# Patient Record
Sex: MALE | Race: WHITE | NOT HISPANIC OR LATINO
[De-identification: names, ages, dates, MRNs, and addresses within clinical notes are randomized per-mention and may not be internally consistent; named-entity substitution may affect disease eponyms.]

---

## 2021-07-26 PROBLEM — Z00.00 ENCOUNTER FOR PREVENTIVE HEALTH EXAMINATION: Status: ACTIVE | Noted: 2021-07-26

## 2021-08-02 ENCOUNTER — APPOINTMENT (OUTPATIENT)
Dept: ENDOCRINOLOGY | Facility: CLINIC | Age: 31
End: 2021-08-02
Payer: MEDICAID

## 2021-08-02 VITALS
WEIGHT: 153 LBS | BODY MASS INDEX: 23.19 KG/M2 | DIASTOLIC BLOOD PRESSURE: 64 MMHG | SYSTOLIC BLOOD PRESSURE: 106 MMHG | HEART RATE: 86 BPM | HEIGHT: 68 IN

## 2021-08-02 DIAGNOSIS — E29.1 TESTICULAR HYPOFUNCTION: ICD-10-CM

## 2021-08-02 PROCEDURE — 99205 OFFICE O/P NEW HI 60 MIN: CPT | Mod: 25

## 2021-08-03 LAB
ALBUMIN SERPL ELPH-MCNC: 4.7 G/DL
ALP BLD-CCNC: 181 U/L
ALT SERPL-CCNC: 25 U/L
ANION GAP SERPL CALC-SCNC: 12 MMOL/L
AST SERPL-CCNC: 21 U/L
BASOPHILS # BLD AUTO: 0.03 K/UL
BASOPHILS NFR BLD AUTO: 0.4 %
BILIRUB SERPL-MCNC: 0.4 MG/DL
BUN SERPL-MCNC: 17 MG/DL
CALCIUM SERPL-MCNC: 10.3 MG/DL
CHLORIDE SERPL-SCNC: 100 MMOL/L
CO2 SERPL-SCNC: 25 MMOL/L
CREAT SERPL-MCNC: 0.84 MG/DL
EOSINOPHIL # BLD AUTO: 0.07 K/UL
EOSINOPHIL NFR BLD AUTO: 1 %
FERRITIN SERPL-MCNC: 179 NG/ML
FSH SERPL-MCNC: 0.9 IU/L
GLUCOSE SERPL-MCNC: 92 MG/DL
HCT VFR BLD CALC: 42.3 %
HGB BLD-MCNC: 13.1 G/DL
IMM GRANULOCYTES NFR BLD AUTO: 0.3 %
LH SERPL-ACNC: 0.8 IU/L
LYMPHOCYTES # BLD AUTO: 1.02 K/UL
LYMPHOCYTES NFR BLD AUTO: 14.3 %
MAN DIFF?: NORMAL
MCHC RBC-ENTMCNC: 27.8 PG
MCHC RBC-ENTMCNC: 31 GM/DL
MCV RBC AUTO: 89.6 FL
MONOCYTES # BLD AUTO: 0.7 K/UL
MONOCYTES NFR BLD AUTO: 9.8 %
NEUTROPHILS # BLD AUTO: 5.31 K/UL
NEUTROPHILS NFR BLD AUTO: 74.2 %
PLATELET # BLD AUTO: 258 K/UL
POTASSIUM SERPL-SCNC: 4.3 MMOL/L
PROLACTIN SERPL-MCNC: 7.2 NG/ML
PROT SERPL-MCNC: 7.4 G/DL
RBC # BLD: 4.72 M/UL
RBC # FLD: 12.4 %
SODIUM SERPL-SCNC: 138 MMOL/L
TSH SERPL-ACNC: 0.64 UIU/ML
WBC # FLD AUTO: 7.15 K/UL

## 2021-08-04 LAB
TESTOST BND SERPL-MCNC: 0.6 PG/ML
TESTOSTERONE TOTAL S: <3 NG/DL
TRANSFERRIN SERPL-MCNC: 280 MG/DL

## 2021-08-04 NOTE — REVIEW OF SYSTEMS
[Negative] : Heme/Lymph [As Noted in HPI] : as noted in HPI [Visual Field Defect] : no visual field defect [Blurred Vision] : no blurred vision [Erectile Dysfunction] : erectile dysfunction [Decreased Libido] : decreased libido [Acne] : no acne [Hair Loss] : no hair loss [Headaches] : no headaches [Depression] : depression [Polydipsia] : no polydipsia [Cold Intolerance] : no cold intolerance [FreeTextEntry2] : tiredness [de-identified] : Hair loss

## 2021-08-04 NOTE — END OF VISIT
[FreeTextEntry3] : All medical record entries made by the Scribe were at my, Dr. Jonatan Rutherford, direction and personally dictated by me on 08/02/2021 . I have reviewed the chart and agree that the record accurately reflects my personal performance of the history, physical exam, assessment and plan. I have also personally directed, reviewed and agreed with the chart. [Time Spent: ___ minutes] : I have spent [unfilled] minutes of time on the encounter.

## 2021-08-04 NOTE — ASSESSMENT
[FreeTextEntry1] : 32 y/o M pt with: \par \par 1. Hypogonadism with marked decreased of free and total testosterone, no info on LH\par Patient was diagnosed with hypogonadism in October 2020 in the [place]. Pt was offered testosterone treatment. [place] the [place] in February [place] and due to persistent fatigue and lack of libido he went to his PCP and eventually went to see a Urologist. Pt's labs from June 2021 identify low testosterone. Today, he denies history of testosterone use except for one month (April 2021 - natural product). No family history of endocrine disorders. No congenital hypogonadism. No history of varicocele, head trauma, storage disease, hemochromatosis. Sense of smell normal. Pt has no children. \par \par Assess Hypothalamus, Pituitary, Gonadal axis. Sent hormone panel, including prolactin, testosterone, FSH, LH, liver function tests, CBC, ferritin transferrin, ace.\par I spoke with patient's , Dr. Shaffer\par Will discuss this initial workup report with the patient and his spouse this Friday.\par \par Return: Friday, August 6, 2021 \par

## 2021-08-04 NOTE — PHYSICAL EXAM
[Alert] : alert [Well Nourished] : well nourished [No Acute Distress] : no acute distress [Normal Sclera/Conjunctiva] : normal sclera/conjunctiva [Normal Outer Ear/Nose] : the ears and nose were normal in appearance [Normal Hearing] : hearing was normal [Thyroid Not Enlarged] : the thyroid was not enlarged [No Respiratory Distress] : no respiratory distress [Normal S1, S2] : normal S1 and S2 [Normal Rate] : heart rate was normal [No Edema] : no peripheral edema [Normal Bowel Sounds] : normal bowel sounds [Not Tender] : non-tender [Not Distended] : not distended [Soft] : abdomen soft [Spine Straight] : spine straight [Normal Gait] : normal gait [Normal Strength/Tone] : muscle strength and tone were normal [No Rash] : no rash [Normal Reflexes] : deep tendon reflexes were 2+ and symmetric [No Tremors] : no tremors [Oriented x3] : oriented to person, place, and time [Visual Fields Grossly Intact] : visual fields grossly intact [Normal Appearance] : normal in appearance [No Masses] : no palpable masses [Penis Abnormality] : the penis was normal [Scrotum] : the scrotum was normal [Testes Mass (___cm)] : there were no testicular masses [No Stigmata of Cushings Syndrome] : no stigmata of Cushings Syndrome [FreeTextEntry1] : small testicles bilaterally mesuring ~ 2.cm with a volume of ~ 15 cc

## 2021-08-04 NOTE — REASON FOR VISIT
[Initial Evaluation] : an initial evaluation [Hypogonadism] : hypogonadism [Spouse] : spouse [FreeTextEntry2] : Dr. Lee Maloney

## 2021-08-04 NOTE — HISTORY OF PRESENT ILLNESS
[FreeTextEntry1] : 32 y/o male pt, with history of  hypogonadism (dx in 10/2020) referred by urologist, Dr. Lee Maloney, presents today to establish endocrine care with me. 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 He denies any recent hospitalizations. He was in his usual state of health in November 2020 and he began feeling weak and unwell. His libido was decreased as well.  He went to a provider in the [place], who performed w/u and informed him that his testicles were "too small and low levels of testosterone." He had an Brain MRI which was reportedly normal. \par Pt arrived in the [place] in [place], and his symptoms continued, with a marked decrease in libido. Pt says he has gained 2 pounds recently. He sleeps well at night. Since April, pt has been taking OC testosterone replacement for ~ 1 month (not prescribed - see below for ingredient list). There was no apparent clinical improvement. went to see Dr Haider ARCHER in June of this year who confirmed dale the pat has low T levels and referred to Endo.\par Pt reports sexual encounters approximately once per month. Pt has a beard and shaves once a week. He denies history of blood transfusion, thalassemia, anemias, genetic disorders. Pt is feeling very down and depressed. Pt's sense of smell is normal. He and his wife have no children. \par \par Labs: \par - 6/18/21 Total Testosterone 9 ( - 1110), Free Testosterone 1.4 (35 - 155)\par - 5/14/21 Total Testosterone 7 (RR 14 - 76), S. creat 0.7, Ca 9.9, LDL-c 51\par \par **Testosterone Medication: L-Arginine, L-Aspartate, Nettle Extract, Tribulus Terrestris, Rhodiola Rosea, Muria Puama, Pine Bark Extract, Gingko Bilboa Extract, Black Pepper Extract, Testofen (Gencor)\par

## 2021-08-06 ENCOUNTER — APPOINTMENT (OUTPATIENT)
Dept: ENDOCRINOLOGY | Facility: CLINIC | Age: 31
End: 2021-08-06
Payer: MEDICAID

## 2021-08-06 DIAGNOSIS — E23.0 HYPOPITUITARISM: ICD-10-CM

## 2021-08-06 PROCEDURE — 99214 OFFICE O/P EST MOD 30 MIN: CPT | Mod: 95

## 2021-08-06 NOTE — REVIEW OF SYSTEMS
[Negative] : Heme/Lymph [Fatigue] : fatigue [Polyuria] : no polyuria [Dizziness] : no dizziness [Fainting] : no fainting [FreeTextEntry3] : no visual disturbances

## 2021-08-06 NOTE — HISTORY OF PRESENT ILLNESS
[FreeTextEntry1] : 32 y/o male pt, with history of hypogonadism (dx in 10/2020), presents today for endocrine f/u. 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 He denies any recent hospitalizations. He was in his usual state of health in November 2020 and he began feeling weak and unwell. His libido was decreased as well. He went to a provider in the [place], who performed w/u and informed him that his testicles were "too small and low levels of testosterone." He had an Brain MRI which was reportedly normal. \par [place] in [place], and his symptoms continued, with a marked decrease in libido. Pt says he has gained 2 pounds recently. He sleeps well at night. Since April, pt has been taking OC testosterone replacement for ~ 1 month (not prescribed - see below for ingredient list). There was no apparent clinical improvement. went to see Dr Haider ARCHER in June of this year who confirmed dale the pat has low T levels and referred to Endo.\par Pt reports sexual encounters approximately once per month. Pt has a beard and shaves once a week. Pt is feeling very down and depressed. Pt's sense of smell is normal. He and his wife have no children. \par \par 8/6/21\par Pt did not have any questions prior to the initiation of the telehealth visit. \par Pt presents today accompanied by his wife via telehealth feeling clinically unchanged and reports his symptoms have not worsened. Pt is concerned about his condition. Pt and his wife have the results of his most recent brain MRI which according to pt's wife states "hypophysis without morphologic changes."  \par Denies lightheadedness, fainting episodes. polyuria, visual disturbances. \par \par Current medications:: OTC Multivitamins, Omega-3, Testosterone medication\par **Testosterone Medication: L-Arginine, L-Aspartate, Nettle Extract, Tribulus Terrestris, Rhodiola Rosea, Muria Puama, Pine Bark Extract, Gingko Bilboa Extract, Black Pepper Extract, Testofen (Gencor)\par \par Labs: \par - 8/2/21: s.creat 0.84, Ca 10.3, FSH 0.9, Free Testosterone 0.6, Total S Testosterone <3, TSH 0.64, Transferrin 280, Ferritin 179, prolactin 7.2, LH 0.8\par - 6/18/21 Total Testosterone 9 ( - 1110), Free Testosterone 1.4 (35 - 155)\par - 5/14/21 Total Testosterone 7 (RR 14 - 76), S. creat 0.7, Ca 9.9, LDL-c 51

## 2021-08-06 NOTE — END OF VISIT
[FreeTextEntry3] : All medical record entries made by the Scribe were at my, Dr. Jonatan Rutherford, direction and personally dictated by me on 08/06/2021. I have reviewed the chart and agree that the record accurately reflects my personal performance of the history, physical exam, assessment and plan. I have also personally directed, reviewed and agreed with the chart.  [Time Spent: ___ minutes] : I have spent [unfilled] minutes of time on the encounter.

## 2021-08-06 NOTE — REASON FOR VISIT
[Home] : at home, [unfilled] , at the time of the visit. [Medical Office: ([facility])___] : at the medical office located in  [Verbal consent obtained from patient] : the patient, [unfilled] [Follow - Up] : a follow-up visit [Hypogonadism] : hypogonadism [Spouse] : spouse

## 2021-08-06 NOTE — ADDENDUM
[FreeTextEntry1] : I, Emily Dewitt, acted solely as a scribe for Dr. Jnoatan Rutherford on this date. 08/06/2021.

## 2021-08-06 NOTE — ASSESSMENT
[FreeTextEntry1] : 32 y/o M pt with: \par \par 1. Secondary hypogonadism - hypogonadotropic hypogonadism \par Pt is  with no children. He has a full-time job but is having a difficult time performing his job due to exhaustion and fatigue. Sense of smell normal. No significant PMHx. \par Labs from 8/2/21 reveal LH 0.8, total testosterone <3 ng/dL, prolactin 7.2, and cortisol pending. \par Order pituitary MRI with and without contrast. Will contact pt with results. \par \par Return: 2 weeks. \par  \par

## 2021-08-09 ENCOUNTER — RESULT REVIEW (OUTPATIENT)
Age: 31
End: 2021-08-09

## 2021-08-17 ENCOUNTER — APPOINTMENT (OUTPATIENT)
Dept: MRI IMAGING | Facility: HOSPITAL | Age: 31
End: 2021-08-17
Payer: MEDICAID

## 2021-08-17 ENCOUNTER — OUTPATIENT (OUTPATIENT)
Dept: OUTPATIENT SERVICES | Facility: HOSPITAL | Age: 31
LOS: 1 days | End: 2021-08-17
Payer: MEDICAID

## 2021-08-17 PROCEDURE — 70553 MRI BRAIN STEM W/O & W/DYE: CPT

## 2021-08-17 PROCEDURE — A9585: CPT

## 2021-08-17 PROCEDURE — 70553 MRI BRAIN STEM W/O & W/DYE: CPT | Mod: 26

## 2021-08-23 ENCOUNTER — TRANSCRIPTION ENCOUNTER (OUTPATIENT)
Age: 31
End: 2021-08-23

## 2021-08-25 ENCOUNTER — NON-APPOINTMENT (OUTPATIENT)
Age: 31
End: 2021-08-25

## 2021-08-25 LAB
ACTH-ESO: 15 PG/ML
CORTICOSTEROID BIND GLOBULIN: 2 MG/DL
CORTIS SERPL-MCNC: 6.5 UG/DL
CORTISOL, FREE: 0.3 UG/DL
PFCX: 4.6 %

## 2021-10-19 ENCOUNTER — NON-APPOINTMENT (OUTPATIENT)
Age: 31
End: 2021-10-19

## 2022-10-13 PROBLEM — E23.0 HYPOGONADOTROPIC HYPOGONADISM: Status: ACTIVE | Noted: 2021-08-06
